# Patient Record
(demographics unavailable — no encounter records)

---

## 2025-01-30 NOTE — HEALTH RISK ASSESSMENT
[Yes] : Yes [Monthly or less (1 pt)] : Monthly or less (1 point) [1 or 2 (0 pts)] : 1 or 2 (0 points) [Never (0 pts)] : Never (0 points) [No] : In the past 12 months have you used drugs other than those required for medical reasons? No [0] : 2) Feeling down, depressed, or hopeless: Not at all (0) [PHQ-2 Negative - No further assessment needed] : PHQ-2 Negative - No further assessment needed [Never] : Never [Patient reported mammogram was normal] : Patient reported mammogram was normal [de-identified] : rare alcohol use  [Audit-CScore] : 1 [de-identified] : walking  [de-identified] : diet needs improvement  [[id number]] : 0 [MammogramDate] : 12/2024 [BoneDensityDate] : 12/2024 [BoneDensityComments] : Osteopenia  [ColonoscopyDate] : 09/2024 [ColonoscopyComments] : Ulcerative colitis, Internal hemorrhoids

## 2025-01-30 NOTE — HISTORY OF PRESENT ILLNESS
[FreeTextEntry1] : Annual physical  [de-identified] : 56 year old female presents for an annual physical.   has HTN- on Amlodipine 5 mg daily  has Hyperlipidemia- on Atorvastatin 10 mg daily  takes Alprazolam PRN for anxiety which she finds to be helpful  requests refill   follows with Gastroenterology diagnosed with ulcerative colitis started on Mesalamine  last colonoscopy done in September 2024- showed left sided ulcerative colitis, internal hemorrhoids   has Hypothyroidism- on Synthroid 25 mcg daily  follows with endocrinology   has had urology evaluation for microscopic hematuria   reports chronic cough has allergies- on Levocetirizine   c/o pain to her right knee  c/o stiffness to both of her knees  c/o stiffness and joint pains to her hands   has stress due to her elderly father with advanced Parkinson's   agrees for a flu vaccine no fever

## 2025-01-30 NOTE — HEALTH RISK ASSESSMENT
[Yes] : Yes [Monthly or less (1 pt)] : Monthly or less (1 point) [1 or 2 (0 pts)] : 1 or 2 (0 points) [Never (0 pts)] : Never (0 points) [No] : In the past 12 months have you used drugs other than those required for medical reasons? No [0] : 2) Feeling down, depressed, or hopeless: Not at all (0) [PHQ-2 Negative - No further assessment needed] : PHQ-2 Negative - No further assessment needed [Never] : Never [Patient reported mammogram was normal] : Patient reported mammogram was normal [de-identified] : rare alcohol use  [Audit-CScore] : 1 [de-identified] : walking  [de-identified] : diet needs improvement  [[id number]] : 0 [MammogramDate] : 12/2024 [BoneDensityDate] : 12/2024 [BoneDensityComments] : Osteopenia  [ColonoscopyDate] : 09/2024 [ColonoscopyComments] : Ulcerative colitis, Internal hemorrhoids

## 2025-01-30 NOTE — PHYSICAL EXAM
[No Acute Distress] : no acute distress [Well Nourished] : well nourished [Well Developed] : well developed [Well-Appearing] : well-appearing [Normal Sclera/Conjunctiva] : normal sclera/conjunctiva [PERRL] : pupils equal round and reactive to light [Normal Oropharynx] : the oropharynx was normal [Normal TMs] : both tympanic membranes were normal [Normal Appearance] : was normal in appearance [Neck Supple] : was supple [Normal] : the thyroid was normal [No Respiratory Distress] : no respiratory distress  [Clear to Auscultation] : lungs were clear to auscultation bilaterally [Normal Rate] : normal rate  [Regular Rhythm] : with a regular rhythm [Normal S1, S2] : normal S1 and S2 [No Murmur] : no murmur heard [No Carotid Bruits] : no carotid bruits [No Edema] : there was no peripheral edema [Soft] : abdomen soft [Non Tender] : non-tender [Normal Bowel Sounds] : normal bowel sounds [Normal Posterior Cervical Nodes] : no posterior cervical lymphadenopathy [Normal Anterior Cervical Nodes] : no anterior cervical lymphadenopathy [Grossly Normal Strength/Tone] : grossly normal strength/tone [No Rash] : no rash [No Focal Deficits] : no focal deficits [Alert and Oriented x3] : oriented to person, place, and time [Normal Insight/Judgement] : insight and judgment were intact [de-identified] : + arthritic changes to fingers of bilateral hands

## 2025-01-30 NOTE — PHYSICAL EXAM
Order placed for mammogram, reception will call office to schedule.    [No Acute Distress] : no acute distress [Well Nourished] : well nourished [Well Developed] : well developed [Well-Appearing] : well-appearing [Normal Sclera/Conjunctiva] : normal sclera/conjunctiva [PERRL] : pupils equal round and reactive to light [Normal Oropharynx] : the oropharynx was normal [Normal TMs] : both tympanic membranes were normal [Normal Appearance] : was normal in appearance [Neck Supple] : was supple [Normal] : the thyroid was normal [No Respiratory Distress] : no respiratory distress  [Clear to Auscultation] : lungs were clear to auscultation bilaterally [Normal Rate] : normal rate  [Regular Rhythm] : with a regular rhythm [Normal S1, S2] : normal S1 and S2 [No Murmur] : no murmur heard [No Carotid Bruits] : no carotid bruits [No Edema] : there was no peripheral edema [Soft] : abdomen soft [Non Tender] : non-tender [Normal Bowel Sounds] : normal bowel sounds [Normal Posterior Cervical Nodes] : no posterior cervical lymphadenopathy [Normal Anterior Cervical Nodes] : no anterior cervical lymphadenopathy [Grossly Normal Strength/Tone] : grossly normal strength/tone [No Rash] : no rash [No Focal Deficits] : no focal deficits [Alert and Oriented x3] : oriented to person, place, and time [Normal Insight/Judgement] : insight and judgment were intact [de-identified] : + arthritic changes to fingers of bilateral hands

## 2025-02-24 NOTE — ASSESSMENT
[FreeTextEntry1] : EXAM Right wrist with no swelling nor erythema. Able to make fist, oppose thumb to small finger and abduct fingers, no overt atrophy. -Phalen's, +tinel at carpal, -tinel at Guyon, -tinel at cubital. -froment, -wartenberg. Intact sensation in median and intact at superficial radial and intact in small and ulnar ring finger(normal at ulnar hand) prior to provocative testing. <2sec cap refill.  Left wrist with no swelling nor erythema. Able to make fist, oppose thumb to small finger and abduct fingers, no overt atrophy. -Phalen's, +tinel at carpal, -tinel at Guyon, -tinel at cubital. -froment, -wartenberg. Intact sensation in median and intact at superficial radial and intact in small and ulnar ring finger(normal at ulnar hand) prior to provocative testing. <2sec cap refill.  ASSESSMENT/PLAN Bilateral CTS - reviewed pathoanatomy. Encouraged nighttime cockup wrist bracing. Can pursue BUE EMG/NCV in light of severity of symptoms - patient will follow-up thereafter to discuss results and develop plan-of-care.  F/u after EMG/NCV.

## 2025-02-24 NOTE — HISTORY OF PRESENT ILLNESS
[de-identified] : Age: 56 year F PMHx: HTN Hand Dominance: LHD Chief Complaint: Patient reports B/L stiffness, intermittent pain with constant numbness with occasional tingling Trauma: NKI Outside Imaging/Treatment: No. OTC Medications: Aspirin OT/PT: No. Bracing: No. Pain worse with: driving, ADL's Pain better with: rest , not driving for long periods of time.

## 2025-02-24 NOTE — HISTORY OF PRESENT ILLNESS
[de-identified] : Age: 56 year F PMHx: HTN Hand Dominance: LHD Chief Complaint: Patient reports B/L stiffness, intermittent pain with constant numbness with occasional tingling Trauma: NKI Outside Imaging/Treatment: No. OTC Medications: Aspirin OT/PT: No. Bracing: No. Pain worse with: driving, ADL's Pain better with: rest , not driving for long periods of time.

## 2025-03-01 NOTE — DISCUSSION/SUMMARY
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    - We discussed their diagnosis and treatment options at length including the risks and benefits of both surgical and non-surgical options. - We will continue conservative treatment with icing, anti-inflammatory medication, and PT - The patient was provided with a prescription to work on hip ER/abductors strengthening along with quad/hamstring stretches and strengthening - The patient was advised to let pain guide the gradual advancement of activities.   Follow up as needed in 6 weeks to re-evaluate

## 2025-03-01 NOTE — DISCUSSION/SUMMARY
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    - We discussed their diagnosis and treatment options at length including the risks and benefits of both surgical and non-surgical options. - We will continue conservative treatment with icing, anti-inflammatory medication, and PT - The patient was provided with a prescription to work on hip ER/abductors strengthening along with quad/hamstring stretches and strengthening - The patient was advised to let pain guide the gradual advancement of activities.   Follow up as needed in 6 weeks to re-evaluate

## 2025-03-01 NOTE — IMAGING
[de-identified] : BILATERAL KNEE Inspection: mild effusion Palpation: medial joint line tenderness, anterior tenderness Knee Range of Motion: 3-125  Strength: 5/5 Quadriceps strength, 5/5 Hamstring strength Neurological: light touch is intact throughout Ligament Stability and Special Tests:  McMurrays: neg Lachman: neg Pivot Shift: neg Posterior Drawer: neg Valgus: neg Varus: neg Patella Apprehension: neg Patella Maltracking: neg  Bilateral X-Ray Examination of the KNEE (4 views): there are no fractures, subluxations or dislocations. Medial and Patellofemoral degenerative changes
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
## 2025-03-01 NOTE — IMAGING
[de-identified] : BILATERAL KNEE Inspection: mild effusion Palpation: medial joint line tenderness, anterior tenderness Knee Range of Motion: 3-125  Strength: 5/5 Quadriceps strength, 5/5 Hamstring strength Neurological: light touch is intact throughout Ligament Stability and Special Tests:  McMurrays: neg Lachman: neg Pivot Shift: neg Posterior Drawer: neg Valgus: neg Varus: neg Patella Apprehension: neg Patella Maltracking: neg  Bilateral X-Ray Examination of the KNEE (4 views): there are no fractures, subluxations or dislocations. Medial and Patellofemoral degenerative changes
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
## 2025-03-14 NOTE — ASSESSMENT
[FreeTextEntry1] : EXAM Right wrist with no swelling nor erythema. Able to make fist, oppose thumb to small finger and abduct fingers, no overt atrophy. -Phalen's, +tinel at carpal, -tinel at Guyon, -tinel at cubital. -froment, -wartenberg. Intact sensation in median and intact at superficial radial and intact in small and ulnar ring finger(normal at ulnar hand) prior to provocative testing. <2sec cap refill.  Left wrist with no swelling nor erythema. Able to make fist, oppose thumb to small finger and abduct fingers, no overt atrophy. -Phalen's, +tinel at carpal, -tinel at Guyon, -tinel at cubital. -froment, -wartenberg. Intact sensation in median and intact at superficial radial and intact in small and ulnar ring finger(normal at ulnar hand) prior to provocative testing. <2sec cap refill.  ASSESSMENT/PLAN Bilateral CTS - reviewed bilateral EMG/NCV results of right moderate CTS and left severe CTS. Given patient's severity and continued symptoms despite nonoperative management, patient indicated for right carpal tunnel release, with left to follow. We did have discussion of continuing nonoperative management; however, patient communicated symptoms were interfering with sleep and ADLs and she wanted to proceed with operative release. We discussed the risks, benefits and alternatives to carpal tunnel release including risk of neurovascular injury, wound dehiscence/infection, continued numbness, continued weakness, need for reoperation, DVT and medical complications associated with anesthesia. Discussed that decompression halts progression, but that improvement with existing sensory or motor deficits are not guaranteed to return. Patient understood this conversation and all questions were answered. Patient will call if she would like to proceed.  OT  If so, plan for right endoscopic carpal tunnel release under local with sedation. [facility]. (left endoscopic carpal tunnel release 2 weeks thereafter)  F/u for surgery, 10 days thereafter.

## 2025-03-14 NOTE — ASSESSMENT
[FreeTextEntry1] : EXAM Right wrist with no swelling nor erythema. Able to make fist, oppose thumb to small finger and abduct fingers, no overt atrophy. -Phalen's, +tinel at carpal, -tinel at Guyon, -tinel at cubital. -froment, -wartenberg. Intact sensation in median and intact at superficial radial and intact in small and ulnar ring finger(normal at ulnar hand) prior to provocative testing. <2sec cap refill.  Left wrist with no swelling nor erythema. Able to make fist, oppose thumb to small finger and abduct fingers, no overt atrophy. -Phalen's, +tinel at carpal, -tinel at Guyon, -tinel at cubital. -froment, -wartenberg. Intact sensation in median and intact at superficial radial and intact in small and ulnar ring finger(normal at ulnar hand) prior to provocative testing. <2sec cap refill.  ASSESSMENT/PLAN Bilateral CTS - reviewed bilateral EMG/NCV results of right moderate CTS and left severe CTS. Given patient's severity and continued symptoms despite nonoperative management, patient indicated for right carpal tunnel release, with left to follow. We did have discussion of continuing nonoperative management; however, patient communicated symptoms were interfering with sleep and ADLs and she wanted to proceed with operative release. We discussed the risks, benefits and alternatives to carpal tunnel release including risk of neurovascular injury, wound dehiscence/infection, continued numbness, continued weakness, need for reoperation, DVT and medical complications associated with anesthesia. Discussed that decompression halts progression, but that improvement with existing sensory or motor deficits are not guaranteed to return. Patient understood this conversation and all questions were answered. Patient will call if she would like to proceed.  OT  If so, plan for right endoscopic carpal tunnel release under local with sedation. [facility]. (left endoscopic carpal tunnel release 2 weeks thereafter)  F/u for surgery, 10 days thereafter.

## 2025-03-14 NOTE — HISTORY OF PRESENT ILLNESS
[de-identified] : Age: 56 year F PMHx: HTN Hand Dominance: LHD Chief Complaint: Patient reports B/L stiffness, intermittent pain with constant numbness with occasional tingling Trauma: NKI Outside Imaging/Treatment: No. OTC Medications: Aspirin OT/PT: No. Bracing: No. Pain worse with: driving, ADL's Pain better with: rest , not driving for long periods of time.   03/15/25: f/u bilateral hands. Patient is here to review EMG results, performed by Dr. Toribio on 03/11/25.

## 2025-03-14 NOTE — HISTORY OF PRESENT ILLNESS
[de-identified] : Age: 56 year F PMHx: HTN Hand Dominance: LHD Chief Complaint: Patient reports B/L stiffness, intermittent pain with constant numbness with occasional tingling Trauma: NKI Outside Imaging/Treatment: No. OTC Medications: Aspirin OT/PT: No. Bracing: No. Pain worse with: driving, ADL's Pain better with: rest , not driving for long periods of time.   03/15/25: f/u bilateral hands. Patient is here to review EMG results, performed by Dr. Toribio on 03/11/25.

## 2025-06-07 NOTE — ASSESSMENT
[FreeTextEntry1] : EXAM Right wrist with no swelling nor erythema. Able to make fist, oppose thumb to small finger and abduct fingers, no overt atrophy. -Phalen's, +tinel at carpal, -tinel at Guyon, -tinel at cubital. -froment, -wartenberg. Intact sensation in median and intact at superficial radial and intact in small and ulnar ring finger(normal at ulnar hand) prior to provocative testing. <2sec cap refill. +ttp at radial styloid.  Left wrist with no swelling nor erythema. Able to make fist, oppose thumb to small finger and abduct fingers, no overt atrophy. -Phalen's, +tinel at carpal, -tinel at Guyon, -tinel at cubital. -froment, -wartenberg. Intact sensation in median and intact at superficial radial and intact in small and ulnar ring finger(normal at ulnar hand) prior to provocative testing. <2sec cap refill.  ASSESSMENT/PLAN Right DeQuervain's Tenosynovitis - reviewed pathoanatomy with patient and discussed treatment ladder including NSAIDs prn, bracing, activity modification, CSI and 1st dorsal compartment release. After discussion of risks/benefits, including glucose intolerance in the diabetic population, patient elected to proceed with CSI to the first dorsal extensor compartment. ***Procedure 1 - 0.5cc 1% lidocaine + 0.5cc 40mg/cc Kenalog administered to the right first dorsal extensor compartment following sterilization with Betadine. Patient tolerated this well.  Bilateral CTS - reviewed bilateral EMG/NCV results of right moderate CTS and left severe CTS. Given patient's severity and continued symptoms despite nonoperative management, patient indicated for right carpal tunnel release, with left to follow. We did have discussion of continuing nonoperative management; however, patient communicated symptoms were interfering with sleep and ADLs and she wanted to proceed with operative release. We discussed the risks, benefits and alternatives to carpal tunnel release including risk of neurovascular injury, wound dehiscence/infection, continued numbness, continued weakness, need for reoperation, DVT and medical complications associated with anesthesia. Discussed that decompression halts progression, but that improvement with existing sensory or motor deficits are not guaranteed to return. Patient understood this conversation and all questions were answered. Patient will call if she would like to proceed.  OT  If so, plan for right endoscopic carpal tunnel release under local with sedation. [facility]. (left endoscopic carpal tunnel release 2 weeks thereafter)  F/u for surgery, 10 days thereafter.

## 2025-06-07 NOTE — HISTORY OF PRESENT ILLNESS
[de-identified] : Age: 56 year F PMHx: HTN Hand Dominance: LHD Chief Complaint: Patient reports B/L stiffness, intermittent pain with constant numbness with occasional tingling Trauma: NKI Outside Imaging/Treatment: No. OTC Medications: Aspirin OT/PT: No. Bracing: No. Pain worse with: driving, ADL's Pain better with: rest , not driving for long periods of time.   03/15/25: f/u bilateral hands. Patient is here to review EMG results, performed by Dr. Toribio on 03/11/25.   06/06/25: f/u bilateral hands. Patient still reports numbness/tingling in the bilateral hands along with trigger finger on the left index finger. Patient reports that she is currently attending 1-2x a week with relief. Patient also notes that she is having pain in the right wrist and was told by her OT that she may have De Quervain's tenosynovitis. Patient has been using wrist braces at night with great relief, stating that she can sleep through the night. Patient is also interested in discussing surgery.

## 2025-06-07 NOTE — ASSESSMENT
[FreeTextEntry1] : EXAM Right wrist with no swelling nor erythema. Able to make fist, oppose thumb to small finger and abduct fingers, no overt atrophy. -Phalen's, +tinel at carpal, -tinel at Guyon, -tinel at cubital. -froment, -wartenberg. Intact sensation in median and intact at superficial radial and intact in small and ulnar ring finger(normal at ulnar hand) prior to provocative testing. <2sec cap refill. +ttp at radial styloid.  Left wrist with no swelling nor erythema. Able to make fist, oppose thumb to small finger and abduct fingers, no overt atrophy. -Phalen's, +tinel at carpal, -tinel at Guyon, -tinel at cubital. -froment, -wartenberg. Intact sensation in median and intact at superficial radial and intact in small and ulnar ring finger(normal at ulnar hand) prior to provocative testing. <2sec cap refill.  ASSESSMENT/PLAN Right DeQuervain's Tenosynovitis - reviewed pathoanatomy with patient and discussed treatment ladder including NSAIDs prn, bracing, activity modification, CSI and 1st dorsal compartment release. After discussion of risks/benefits, including glucose intolerance in the diabetic population, patient elected to proceed with CSI to the first dorsal extensor compartment. ***Procedure 1 - 0.5cc 1% lidocaine + 0.5cc 40mg/cc Kenalog administered to the right first dorsal extensor compartment following sterilization with Betadine. Patient tolerated this well.  Bilateral CTS - reviewed bilateral EMG/NCV results of right moderate CTS and left severe CTS. Given patient's severity and continued symptoms despite nonoperative management, patient indicated for right carpal tunnel release, with left to follow. We did have discussion of continuing nonoperative management; however, patient communicated symptoms were interfering with sleep and ADLs and she wanted to proceed with operative release. We discussed the risks, benefits and alternatives to carpal tunnel release including risk of neurovascular injury, wound dehiscence/infection, continued numbness, continued weakness, need for reoperation, DVT and medical complications associated with anesthesia. Discussed that decompression halts progression, but that improvement with existing sensory or motor deficits are not guaranteed to return. Patient understood this conversation and all questions were answered. Patient will call if she would like to proceed.  OT  If so, plan for right endoscopic carpal tunnel release under local with sedation. [facility]. (left endoscopic carpal tunnel release 2 weeks thereafter)  F/u for surgery, 10 days thereafter.

## 2025-06-07 NOTE — HISTORY OF PRESENT ILLNESS
[de-identified] : Age: 56 year F PMHx: HTN Hand Dominance: LHD Chief Complaint: Patient reports B/L stiffness, intermittent pain with constant numbness with occasional tingling Trauma: NKI Outside Imaging/Treatment: No. OTC Medications: Aspirin OT/PT: No. Bracing: No. Pain worse with: driving, ADL's Pain better with: rest , not driving for long periods of time.   03/15/25: f/u bilateral hands. Patient is here to review EMG results, performed by Dr. Toribio on 03/11/25.   06/06/25: f/u bilateral hands. Patient still reports numbness/tingling in the bilateral hands along with trigger finger on the left index finger. Patient reports that she is currently attending 1-2x a week with relief. Patient also notes that she is having pain in the right wrist and was told by her OT that she may have De Quervain's tenosynovitis. Patient has been using wrist braces at night with great relief, stating that she can sleep through the night. Patient is also interested in discussing surgery.

## 2025-06-09 NOTE — DISCUSSION/SUMMARY
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 - We will continue conservative treatment with icing, anti-inflammatory medication, and PT (new rx provided) - The patient was advised to let pain guide the gradual advancement of activities. - Discussed possibility of injections vs MRI in the future if pain and mechanical symptoms persist or worsen  Follow up: prn
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
## 2025-06-09 NOTE — DISCUSSION/SUMMARY
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 - We will continue conservative treatment with icing, anti-inflammatory medication, and PT (new rx provided) - The patient was advised to let pain guide the gradual advancement of activities. - Discussed possibility of injections vs MRI in the future if pain and mechanical symptoms persist or worsen  Follow up: prn
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
## 2025-06-09 NOTE — IMAGING
[de-identified] : BILATERAL KNEE Inspection: mild effusion Palpation: medial joint line tenderness, anterior tenderness Knee Range of Motion: 3-125  Strength: 5/5 Quadriceps strength, 5/5 Hamstring strength Neurological: light touch is intact throughout Ligament Stability and Special Tests:  McMurrays: neg Lachman: neg Pivot Shift: neg Posterior Drawer: neg Valgus: neg Varus: neg Patella Apprehension: neg Patella Maltracking: neg

## 2025-06-09 NOTE — IMAGING
[de-identified] : BILATERAL KNEE Inspection: mild effusion Palpation: medial joint line tenderness, anterior tenderness Knee Range of Motion: 3-125  Strength: 5/5 Quadriceps strength, 5/5 Hamstring strength Neurological: light touch is intact throughout Ligament Stability and Special Tests:  McMurrays: neg Lachman: neg Pivot Shift: neg Posterior Drawer: neg Valgus: neg Varus: neg Patella Apprehension: neg Patella Maltracking: neg

## 2025-06-28 NOTE — ASSESSMENT
[FreeTextEntry1] : HTN: BP stable c/w Amlodipine  Hyperlipidemia: LDL previously not at goal  c/w Atorvastatin focus on diet and exercise as tolerated  check blood work  Pre-diabetes: last A1C 5.9  focus on diet and exercise as tolerated check blood work   Hashimoto's: stable  c/w Synthroid following with endocrinology  Anxiety: c/w Alprazolam PRN  reviewed    Patient reports he uses O2 prn/fall precautions/oxygen therapy device and L/min

## 2025-06-28 NOTE — PHYSICAL EXAM
[No Acute Distress] : no acute distress [Well Nourished] : well nourished [Well Developed] : well developed [Well-Appearing] : well-appearing [Normal TMs] : both tympanic membranes were normal [Normal Appearance] : was normal in appearance [Neck Supple] : was supple [No Respiratory Distress] : no respiratory distress  [Clear to Auscultation] : lungs were clear to auscultation bilaterally [Normal Rate] : normal rate  [Regular Rhythm] : with a regular rhythm [Normal S1, S2] : normal S1 and S2 [No Murmur] : no murmur heard [Alert and Oriented x3] : oriented to person, place, and time [Normal Insight/Judgement] : insight and judgment were intact [Normal Anterior Cervical Nodes] : no anterior cervical lymphadenopathy

## 2025-06-28 NOTE — HISTORY OF PRESENT ILLNESS
[FreeTextEntry1] : Follow up  [de-identified] : 57 year old female presents for a follow up. She currently feels well today.   has HTN- on Amlodipine 5 mg daily  has Hyperlipidemia- on Atorvastatin 10 mg daily  last A1C 5.9 from Jan 2025- pre-diabetic range   takes Alprazolam PRN for anxiety which she finds to be helpful  requests refill   follows with Gastroenterology diagnosed with ulcerative colitis started on Mesalamine  last colonoscopy done in September 2024- showed left sided ulcerative colitis, internal hemorrhoids   has Hypothyroidism- on Synthroid 25 mcg daily  follows with endocrinology   has had urology evaluation for microscopic hematuria   has h/o chronic cough has allergies- on Levocetirizine   following with orthopedist for bilateral knee pain due to arthritis currently in physical therapy which has been helping  also following with orthopedic hand surgery for carpal tunnel syndrome- currently in occupational therapy which has been helping